# Patient Record
Sex: FEMALE | Race: WHITE
[De-identification: names, ages, dates, MRNs, and addresses within clinical notes are randomized per-mention and may not be internally consistent; named-entity substitution may affect disease eponyms.]

---

## 2021-06-23 ENCOUNTER — HOSPITAL ENCOUNTER (EMERGENCY)
Dept: HOSPITAL 47 - EC | Age: 66
Discharge: HOME | End: 2021-06-23
Payer: COMMERCIAL

## 2021-06-23 VITALS — DIASTOLIC BLOOD PRESSURE: 100 MMHG | SYSTOLIC BLOOD PRESSURE: 202 MMHG | HEART RATE: 68 BPM | RESPIRATION RATE: 18 BRPM

## 2021-06-23 VITALS — TEMPERATURE: 98.2 F

## 2021-06-23 DIAGNOSIS — M62.830: Primary | ICD-10-CM

## 2021-06-23 DIAGNOSIS — I10: ICD-10-CM

## 2021-06-23 DIAGNOSIS — I25.10: ICD-10-CM

## 2021-06-23 DIAGNOSIS — I25.2: ICD-10-CM

## 2021-06-23 LAB
PH UR: 7 [PH] (ref 5–8)
SP GR UR: 1 (ref 1–1.03)
UROBILINOGEN UR QL STRIP: <2 MG/DL (ref ?–2)

## 2021-06-23 PROCEDURE — 72100 X-RAY EXAM L-S SPINE 2/3 VWS: CPT

## 2021-06-23 PROCEDURE — 99283 EMERGENCY DEPT VISIT LOW MDM: CPT

## 2021-06-23 PROCEDURE — 81003 URINALYSIS AUTO W/O SCOPE: CPT

## 2021-06-23 NOTE — ED
Back Pain HPI





- General


Chief Complaint: Back Pain/Injury


Stated Complaint: back pain


Time Seen by Provider: 06/23/21 13:03


Source: patient


Limitations: no limitations





- History of Present Illness


Initial Comments: 





Patient is a 66-year-old female presenting to emergency Department with 

complaints of left low back pain after she lifted someone out of the bathtub 

about a week and a half ago.  She has been trying ibuprofen, heat to the area 

and gentle massage which does seem to help for a little bit but is still 

aggravating her so she came in for evaluation.  She tried calling her primary 

care physician's office however he is out of town for this next week.  She 

denies any numbness and tingling to extremities, no saddle paresthesias, no loss

of bowel or bladder control.  She denies any chest pain or shortness of breath. 

She has no further complaints at this time.  She is hypertensive upon arrival 

however patient states she does have like syndrome, she does take medication for

her blood pressure that she started a few months ago and also has a follow-up 

with her PCP regarding her blood pressure in 2 weeks.  He denies any headache, 

no blurry vision, no nausea or vomiting.





- Related Data


                                  Previous Rx's











 Medication  Instructions  Recorded


 


Cyclobenzaprine [Flexeril] 5 mg PO BID #15 tablet 06/23/21











                                    Allergies











Allergy/AdvReac Type Severity Reaction Status Date / Time


 


zolpidem [From Ambien] Allergy  Hallucinati Verified 06/23/21 11:53





   ons  














Review of Systems


ROS Statement: 


Those systems with pertinent positive or pertinent negative responses have been 

documented in the HPI.





ROS Other: All systems not noted in ROS Statement are negative.





Past Medical History


Past Medical History: Coronary Artery Disease (CAD), Hypertension, Myocardial 

Infarction (MI)


Additional Past Medical History / Comment(s): chronic back pain


History of Any Multi-Drug Resistant Organisms: None Reported


Past Surgical History: Orthopedic Surgery


Additional Past Surgical History / Comment(s): lt arm


Past Psychological History: No Psychological Hx Reported


Smoking Status: Never smoker


Past Alcohol Use History: Occasional


Past Drug Use History: None Reported





General Exam





- General Exam Comments


Initial Comments: 





GENERAL: 


Patient is well-developed and well-nourished.  Patient is nontoxic and in no 

acute distress.





HEAD: 


Atraumatic, normocephalic.





EYES:


Pupils equal round and reactive to light, extraocular movements intact, sclera 

anicteric, conjunctiva are normal.  Eyelids were unremarkable.





ENT: 


TMs normal, nares patent, oropharynx clear without exudates.  Moist mucous 

membranes.





NECK: 


Normal range of motion, supple without lymphadenopathy or JVD.





LUNGS:


Unlabored respirations.  Breath sounds clear to auscultation bilaterally and 

equal.  No wheezes rales or rhonchi.





HEART:


Regular rate and rhythm without murmurs, rubs or gallops.





ABDOMEN: 


Soft, nontender, normoactive bowel sounds.  No guarding, no rebound.  No masses 

appreciated.





: Deferred 





MUSCULOSKELETAL: 


Normal extremities with adequate strength and normal range of motion, no pitting

or edema.  No clubbing or cyanosis.  She has some mild tenderness to the left 

low back.





NEUROLOGICAL: 


Patient is alert and oriented x 3.  Motor and sensory are also intact.  Cranial 

nerves II through XII grossly intact.  Symmetrical smile.  Normal speech, normal

gait.   





PSYCH:


Normal mood, normal affect.





SKIN:


 Warm, Dry, normal turgor, no rashes or lesions noted.


Limitations: no limitations





Course


                                   Vital Signs











  06/23/21





  11:49


 


Temperature 98.2 F


 


Pulse Rate 73


 


Respiratory 20





Rate 


 


Blood Pressure 228/100


 


O2 Sat by Pulse 98





Oximetry 














Medical Decision Making





- Medical Decision Making





Patient is a 66-year-old female here with left low back pain for the past week 

and a half after lifting somebody.  Patient has no acute neuro deficits on exam.

 She is hypertensive upon arrival however she states she does suffer from a cold

syndrome, she already takes medications for hypertension.  X-rays of the lumbar 

spine today shows degenerative disc disease, osteoarthritic changes, no acute 

fractures.  I recommend patient take muscle relaxer at night, continue with 

Tylenol or ibuprofen at home as well as the heat and massage.  I also 

recommended follow up with her PCP if symptoms persist.  Patient's blood 

pressure was rechecked, it is 202/100.  Patient continues to have no other 

symptoms, no headache or blurred vision.  I discussed with her that this is a 

very high blood pressure and I recommend IV  medications to help lower it.  

Patient refuses this.  She states she has white coat syndrome and is typically 

really high in hospitals and doctors offices.  She states she does have a blood 

pressure machine at home and she will recheck in a few hours while she is at 

home and resting.  She also has an appointment with her doctor in 2 weeks for 

recheck of her blood pressure and her blood pressure medications.  Patient is 

stable for discharge at this time.  Return parameters were discussed with her 

and she verbalized understanding.  Case discussed with Dr. Brown.





- Lab Data


                                   Lab Results











  06/23/21 Range/Units





  11:56 


 


Urine Color  Light Yellow  


 


Urine Appearance  Clear  (Clear)  


 


Urine pH  7.0  (5.0-8.0)  


 


Ur Specific Gravity  1.003  (1.001-1.035)  


 


Urine Protein  Negative  (Negative)  


 


Urine Glucose (UA)  Negative  (Negative)  


 


Urine Ketones  Negative  (Negative)  


 


Urine Blood  Negative  (Negative)  


 


Urine Nitrite  Negative  (Negative)  


 


Urine Bilirubin  Negative  (Negative)  


 


Urine Urobilinogen  <2.0  (<2.0)  mg/dL


 


Ur Leukocyte Esterase  Negative  (Negative)  














Disposition


Clinical Impression: 


 Mechanical back pain, Muscle spasm





Disposition: HOME SELF-CARE


Condition: Stable


Instructions (If sedation given, give patient instructions):  Acute Low Back 

Pain (ED)


Additional Instructions: 


Please return to the Emergency Department if symptoms worsen or any other 

concerns.


Radha muscle relaxer at nighttime.  May continue Tylenol or ibuprofen for 

discomfort, heat to the area and massage.  


Follow-up with your PCP regarding her back pain if it continues as well as high 

blood pressure.


Prescriptions: 


Cyclobenzaprine [Flexeril] 5 mg PO BID #15 tablet


Is patient prescribed a controlled substance at d/c from ED?: No


Referrals: 


Rodney Jo MD [Primary Care Provider] - 1-2 days


Time of Disposition: 14:17

## 2021-06-23 NOTE — XR
EXAMINATION TYPE: XR lumbar spine 2 or 3V

 

DATE OF EXAM: 6/23/2021

 

COMPARISON: NONE

 

HISTORY: Back pain

 

TECHNIQUE: 3 views lumbar spine

 

FINDINGS: 

 

There is dextrocurvature of the lumbar spine. Lower lumbar facet arthropathy is seen. Multilevel oste
ophytosis is noted. Vertebral body heights are preserved. Vascular calcifications are present. Surgic
al clips are seen in the right upper abdomen.

 

IMPRESSION: 

Dextrocurvature with disc disease and osteoarthritic changes of the lumbar spine.